# Patient Record
Sex: MALE | Race: WHITE
[De-identification: names, ages, dates, MRNs, and addresses within clinical notes are randomized per-mention and may not be internally consistent; named-entity substitution may affect disease eponyms.]

---

## 2018-07-01 ENCOUNTER — HOSPITAL ENCOUNTER (OUTPATIENT)
Dept: HOSPITAL 9 - MADERS | Age: 78
Setting detail: OBSERVATION
LOS: 1 days | Discharge: HOME | End: 2018-07-02
Attending: FAMILY MEDICINE | Admitting: FAMILY MEDICINE
Payer: MEDICARE

## 2018-07-01 VITALS — BODY MASS INDEX: 26.3 KG/M2

## 2018-07-01 DIAGNOSIS — H11.30: ICD-10-CM

## 2018-07-01 DIAGNOSIS — Z88.2: ICD-10-CM

## 2018-07-01 DIAGNOSIS — E87.6: ICD-10-CM

## 2018-07-01 DIAGNOSIS — R11.2: Primary | ICD-10-CM

## 2018-07-01 LAB
ALBUMIN SERPL BCG-MCNC: 4.2 G/DL (ref 3.4–4.8)
ALP SERPL-CCNC: 80 U/L (ref 40–150)
ALT SERPL W P-5'-P-CCNC: 48 U/L (ref 8–55)
AMYLASE SERPL-CCNC: 33 U/L (ref 20–160)
ANION GAP SERPL CALC-SCNC: 16 MMOL/L (ref 10–20)
APTT PPP: 33.4 SEC (ref 22.9–36.1)
AST SERPL-CCNC: 44 U/L (ref 5–34)
BASOPHILS # BLD AUTO: 0.1 THOU/UL (ref 0–0.2)
BASOPHILS NFR BLD AUTO: 0.6 % (ref 0–1)
BILIRUB SERPL-MCNC: 0.6 MG/DL (ref 0.2–1.2)
BUN SERPL-MCNC: 21 MG/DL (ref 8.4–25.7)
CALCIUM SERPL-MCNC: 9.2 MG/DL (ref 7.8–10.44)
CHLORIDE SERPL-SCNC: 110 MMOL/L (ref 98–107)
CK MB SERPL-MCNC: 1.3 NG/ML (ref 0–6.6)
CO2 SERPL-SCNC: 18 MMOL/L (ref 23–31)
CREAT CL PREDICTED SERPL C-G-VRATE: 0 ML/MIN (ref 70–130)
EOSINOPHIL # BLD AUTO: 0.1 THOU/UL (ref 0–0.7)
EOSINOPHIL NFR BLD AUTO: 0.8 % (ref 0–10)
GLOBULIN SER CALC-MCNC: 2.7 G/DL (ref 2.4–3.5)
GLUCOSE SERPL-MCNC: 118 MG/DL (ref 83–110)
HGB BLD-MCNC: 13.1 G/DL (ref 14–18)
INR PPP: 2.4
LIPASE SERPL-CCNC: 30 U/L (ref 8–78)
LYMPHOCYTES # BLD AUTO: 1.4 THOU/UL (ref 1.2–3.4)
LYMPHOCYTES NFR BLD AUTO: 15.3 % (ref 21–51)
MCH RBC QN AUTO: 30.7 PG (ref 27–31)
MCV RBC AUTO: 91.3 FL (ref 78–98)
MONOCYTES # BLD AUTO: 0.6 THOU/UL (ref 0.11–0.59)
MONOCYTES NFR BLD AUTO: 6.8 % (ref 0–10)
NEUTROPHILS # BLD AUTO: 6.8 THOU/UL (ref 1.4–6.5)
NEUTROPHILS NFR BLD AUTO: 76.6 % (ref 42–75)
PLATELET # BLD AUTO: 179 THOU/UL (ref 130–400)
POTASSIUM SERPL-SCNC: 3.2 MMOL/L (ref 3.5–5.1)
PROTHROMBIN TIME: 25.8 SEC (ref 12–14.7)
RBC # BLD AUTO: 4.26 MILL/UL (ref 4.7–6.1)
SODIUM SERPL-SCNC: 141 MMOL/L (ref 136–145)
SP GR UR STRIP: 1.01 (ref 1–1.03)
TROPONIN I SERPL DL<=0.01 NG/ML-MCNC: (no result) NG/ML (ref ?–0.03)
WBC # BLD AUTO: 8.9 THOU/UL (ref 4.8–10.8)

## 2018-07-01 PROCEDURE — G0378 HOSPITAL OBSERVATION PER HR: HCPCS

## 2018-07-01 PROCEDURE — 85025 COMPLETE CBC W/AUTO DIFF WBC: CPT

## 2018-07-01 PROCEDURE — 80053 COMPREHEN METABOLIC PANEL: CPT

## 2018-07-01 PROCEDURE — 80048 BASIC METABOLIC PNL TOTAL CA: CPT

## 2018-07-01 PROCEDURE — A4216 STERILE WATER/SALINE, 10 ML: HCPCS

## 2018-07-01 PROCEDURE — 85610 PROTHROMBIN TIME: CPT

## 2018-07-01 PROCEDURE — 96361 HYDRATE IV INFUSION ADD-ON: CPT

## 2018-07-01 PROCEDURE — 83690 ASSAY OF LIPASE: CPT

## 2018-07-01 PROCEDURE — 36415 COLL VENOUS BLD VENIPUNCTURE: CPT

## 2018-07-01 PROCEDURE — 83735 ASSAY OF MAGNESIUM: CPT

## 2018-07-01 PROCEDURE — 84443 ASSAY THYROID STIM HORMONE: CPT

## 2018-07-01 PROCEDURE — 85730 THROMBOPLASTIN TIME PARTIAL: CPT

## 2018-07-01 PROCEDURE — 96374 THER/PROPH/DIAG INJ IV PUSH: CPT

## 2018-07-01 PROCEDURE — 83605 ASSAY OF LACTIC ACID: CPT

## 2018-07-01 PROCEDURE — 82553 CREATINE MB FRACTION: CPT

## 2018-07-01 PROCEDURE — 82150 ASSAY OF AMYLASE: CPT

## 2018-07-01 PROCEDURE — 81003 URINALYSIS AUTO W/O SCOPE: CPT

## 2018-07-01 PROCEDURE — C9113 INJ PANTOPRAZOLE SODIUM, VIA: HCPCS

## 2018-07-01 PROCEDURE — 96375 TX/PRO/DX INJ NEW DRUG ADDON: CPT

## 2018-07-01 PROCEDURE — 84484 ASSAY OF TROPONIN QUANT: CPT

## 2018-07-01 PROCEDURE — 74176 CT ABD & PELVIS W/O CONTRAST: CPT

## 2018-07-01 RX ADMIN — CYCLOSPORINE SCH: 0.5 EMULSION OPHTHALMIC at 20:45

## 2018-07-01 RX ADMIN — Medication SCH ML: at 16:03

## 2018-07-01 RX ADMIN — AZELASTINE HYDROCHLORIDE SCH: 137 SPRAY, METERED NASAL at 20:37

## 2018-07-01 RX ADMIN — Medication SCH: at 20:39

## 2018-07-01 RX ADMIN — CYCLOSPORINE SCH ML: 0.5 EMULSION OPHTHALMIC at 20:39

## 2018-07-01 NOTE — CT
ABDOMEN AND PELVIC CT SCAN WITHOUT IV CONTRAST:

D

ate:  07/01/18 

 

HISTORY:  

78-year-old male with history of chronic vomiting. 

 

FINDINGS:

Minimal bilateral posterior pleural thickening. Mild linear stranding in both lung bases, slightly mo
re prominent on the right side, nonspecific, possibly some chronic change versus some minimal subsegm
ental atelectasis. Evidence for cardiomegaly with transvenous pacemaker. Small hiatal hernia. Postop 
cholecystectomy. Visualized pancreas, spleen, and adrenal glands are unremarkable. Multiple bilateral
 renal cysts up to 5.4 cm on the left side. No renal hydronephrosis. Status post laminectomy involvin
g the lumbar spine. Status post appendectomy. Bilateral fat-containing inguinal hernia, as well as an
 umbilical fat-containing hernia. No bowel obstruction, abscess, adenopathy, or abnormal fluid collec
tion. No renal calculus or  obstruction. 

 

IMPRESSION: 

Umbilical and small bilateral fat-containing inguinal hernias. Small hiatal hernia. Postop cholecyste
ctomy and appendectomy. Multiple bilateral renal cysts. Postop changes of the lumbar spine. Other fin
dings as above. No significant acute process. 

 

 

POS: [facility]

## 2018-07-02 VITALS — SYSTOLIC BLOOD PRESSURE: 132 MMHG | TEMPERATURE: 99.2 F | DIASTOLIC BLOOD PRESSURE: 66 MMHG

## 2018-07-02 LAB
ANION GAP SERPL CALC-SCNC: 15 MMOL/L (ref 10–20)
APTT PPP: 35.5 SEC (ref 22.9–36.1)
BASOPHILS # BLD AUTO: 0 THOU/UL (ref 0–0.2)
BASOPHILS NFR BLD AUTO: 0.4 % (ref 0–1)
BUN SERPL-MCNC: 13 MG/DL (ref 8.4–25.7)
CALCIUM SERPL-MCNC: 8 MG/DL (ref 7.8–10.44)
CHLORIDE SERPL-SCNC: 116 MMOL/L (ref 98–107)
CO2 SERPL-SCNC: 15 MMOL/L (ref 23–31)
CREAT CL PREDICTED SERPL C-G-VRATE: 58 ML/MIN (ref 70–130)
EOSINOPHIL # BLD AUTO: 0 THOU/UL (ref 0–0.7)
EOSINOPHIL NFR BLD AUTO: 0 % (ref 0–10)
GLUCOSE SERPL-MCNC: 89 MG/DL (ref 83–110)
HGB BLD-MCNC: 12.4 G/DL (ref 14–18)
INR PPP: 2.4
LYMPHOCYTES # BLD AUTO: 1.1 THOU/UL (ref 1.2–3.4)
LYMPHOCYTES NFR BLD AUTO: 10 % (ref 21–51)
MCH RBC QN AUTO: 31.5 PG (ref 27–31)
MCV RBC AUTO: 91.4 FL (ref 78–98)
MONOCYTES # BLD AUTO: 0.9 THOU/UL (ref 0.11–0.59)
MONOCYTES NFR BLD AUTO: 8.1 % (ref 0–10)
NEUTROPHILS # BLD AUTO: 8.5 THOU/UL (ref 1.4–6.5)
NEUTROPHILS NFR BLD AUTO: 81.4 % (ref 42–75)
PLATELET # BLD AUTO: 169 THOU/UL (ref 130–400)
POTASSIUM SERPL-SCNC: 3.1 MMOL/L (ref 3.5–5.1)
PROTHROMBIN TIME: 26.2 SEC (ref 12–14.7)
RBC # BLD AUTO: 3.94 MILL/UL (ref 4.7–6.1)
SODIUM SERPL-SCNC: 143 MMOL/L (ref 136–145)
WBC # BLD AUTO: 10.4 THOU/UL (ref 4.8–10.8)

## 2018-07-02 RX ADMIN — AZELASTINE HYDROCHLORIDE SCH: 137 SPRAY, METERED NASAL at 09:09

## 2018-07-02 RX ADMIN — CYCLOSPORINE SCH: 0.5 EMULSION OPHTHALMIC at 09:06

## 2018-07-02 RX ADMIN — Medication SCH: at 09:07

## 2018-08-14 ENCOUNTER — HOSPITAL ENCOUNTER (OUTPATIENT)
Dept: HOSPITAL 9 - MADLAB | Age: 78
Discharge: HOME | End: 2018-08-14
Payer: MEDICARE

## 2018-08-14 DIAGNOSIS — M45.0: Primary | ICD-10-CM

## 2018-08-14 PROCEDURE — 36415 COLL VENOUS BLD VENIPUNCTURE: CPT

## 2018-08-14 PROCEDURE — 85652 RBC SED RATE AUTOMATED: CPT

## 2018-08-14 PROCEDURE — 86140 C-REACTIVE PROTEIN: CPT

## 2019-03-14 ENCOUNTER — HOSPITAL ENCOUNTER (OUTPATIENT)
Dept: HOSPITAL 92 - BICMAMMO | Age: 79
Discharge: HOME | End: 2019-03-14
Attending: INTERNAL MEDICINE
Payer: MEDICARE

## 2019-03-14 DIAGNOSIS — M81.0: Primary | ICD-10-CM

## 2019-03-14 DIAGNOSIS — M85.859: ICD-10-CM

## 2019-03-14 PROCEDURE — 77080 DXA BONE DENSITY AXIAL: CPT

## 2019-04-12 ENCOUNTER — HOSPITAL ENCOUNTER (OUTPATIENT)
Dept: HOSPITAL 9 - MADRAD | Age: 79
Discharge: HOME | End: 2019-04-12
Payer: MEDICARE

## 2019-04-12 DIAGNOSIS — M54.6: Primary | ICD-10-CM

## 2019-04-12 DIAGNOSIS — M47.814: ICD-10-CM

## 2019-04-12 PROCEDURE — 72070 X-RAY EXAM THORAC SPINE 2VWS: CPT

## 2019-04-12 NOTE — RAD
TWO VIEWS OF THE THORACIC SPINE:

 

COMPARISON: 

None.

 

HISTORY: 

Mid back pain.

 

FINDINGS: 

Two views of the thoracic spine show normal height and alignment of the vertebral bodies without frac
ture or subluxation.  The intervertebral disks are narrowed and moderate osteophytes are seen through
out the thoracic spine.  

 

IMPRESSION: 

Moderate degenerative changes of the thoracic spine without acute osseous abnormality.

 

POS: [facility]

## 2019-07-24 ENCOUNTER — HOSPITAL ENCOUNTER (OUTPATIENT)
Dept: HOSPITAL 92 - BICMAMMO | Age: 79
Discharge: HOME | End: 2019-07-24
Attending: INTERNAL MEDICINE
Payer: MEDICARE

## 2019-07-24 DIAGNOSIS — M81.0: Primary | ICD-10-CM

## 2019-07-24 DIAGNOSIS — M85.852: ICD-10-CM

## 2019-07-24 DIAGNOSIS — M85.851: ICD-10-CM

## 2019-07-24 PROCEDURE — 77080 DXA BONE DENSITY AXIAL: CPT

## 2019-07-24 NOTE — BD
EXAM: DEXA bone density examination



HISTORY: 79-year-old male with osteoporosis 



COMPARISON: 3/14/2019



FINDINGS:

L1--bone mineral density 1.167 g/sq cm; T score 0.9

L2--bone mineral density 1.430 g/sq cm; T score 3.1

L3--bone mineral density 1.215 g/sq cm; T score 1.0

L4--bone mineral density 1.348 g/sq cm; T score 2.3

Total L1-L4--bone mineral density g/sq cm; T score



Right femoral neck--bone mineral density0.628; T score -2.2

Total proximal right femur--bone mineral density 0.874; T score -1.1



Left femoral neck--bone mineral density0.600; T score -2.4

Total proximal left femur--bone mineral density 0.882; T score -1.0



IMPRESSION: Osteopenia This patient has a 10 year WHO fracture risk of a major osteoporotic fracture 
of 11% and of a hip fracture of 4.8%. When compared to the prior examination, the bone density in

the left hip has decreased approximately 5%.



Reported By: Holger Velez 

Electronically Signed:  7/24/2019 1:47 PM